# Patient Record
(demographics unavailable — no encounter records)

---

## 2025-03-31 NOTE — HISTORY OF PRESENT ILLNESS
[Snoring] : snoring [Witnessed Apneas] : witnessed sleep apnea [Frequent Nocturnal Awakening] : frequent nocturnal awakening [Awakening With Dry Mouth] : awakening with dry mouth [de-identified] : 7-year-old patient presents for initial evaluation for snoring, mouth breathing. Accompanied by mother and sister.  Reports that she is mouth breathing and increased snoring at night for the last 4-5. Sister states that she mouth breathes during the day. Denies frequent wakes at night. Dentist states that she may have tonsil stones. No history of recurrent strep throat or throat infections. Denies any trouble swallowing or sore throat.   [Daytime Somnolence] : no daytime somnolence [Unintentional Sleep while Active] : no unintentional sleep while active [Unintentional Sleep While Inactive] : no unintentional sleep while inactive [Awakes Unrefreshed] : does not awake unrefreshed [Awakes with Headache] : no headache upon awakening

## 2025-03-31 NOTE — PHYSICAL EXAM
[Normal] : mucosa is normal [Midline] : trachea located in midline position [de-identified] : bilateral impacted wax cleaned with curette